# Patient Record
Sex: FEMALE | Race: WHITE | NOT HISPANIC OR LATINO | URBAN - METROPOLITAN AREA
[De-identification: names, ages, dates, MRNs, and addresses within clinical notes are randomized per-mention and may not be internally consistent; named-entity substitution may affect disease eponyms.]

---

## 2017-10-20 ENCOUNTER — TELEPHONE (OUTPATIENT)
Dept: INTERNAL MEDICINE | Age: 32
End: 2017-10-20

## 2018-04-11 NOTE — ED
Antoinette CONN Abhishek, scribed for Kj Leach MD on 04/11/18 at 0616 .





Throat Pain/Nasal Congestion





- HPI Summary


HPI Summary: 





The pt is a 31 y/o female with a chief complaint of dental pain and is 

presenting to the [facility]. The pt states she has an "abscess in the tooth" and 

that the pain is "unbearable." Pt denies of fever. Pt also reports from being 

"sick" with vertigo. She also states that she needs new antibiotic. The patient 

rates the pain 6/10 in severity. Symptoms aggravated by nothing. Symptoms 

alleviated by nothing.





- History of Current Complaint


Chief Complaint: EDDentalPain


Time Seen by Provider: 04/11/18 04:32


Hx Obtained From: Patient


Onset/Duration: Gradual Onset, Still Present


Associated Signs And Symptoms: Positive: Negative





- Allergies/Home Medications


Allergies/Adverse Reactions: 


 Allergies











Allergy/AdvReac Type Severity Reaction Status Date / Time


 


codeine Allergy  Vomiting Verified 04/11/18 04:27


 


Penicillins Allergy  Rash And Verified 04/11/18 04:27





   Itching  











Home Medications: 


 Home Medications





NK [No Home Medications Reported]  04/11/18 [History Confirmed 04/11/18]











PMH/Surg Hx/FS Hx/Imm Hx


Previously Healthy: Yes


Sensory History: 


   Denies: Hx Legally Blind, Hx Deafness


Opthamlomology History: 


   Denies: Hx Legally Blind


Infectious Disease History: No


Infectious Disease History: 


   Denies: Traveled Outside the [place] in Last 30 Days





- Family History


Known Family History: Positive: Cardiac Disease, Diabetes





- Social History


Occupation: Employed Full-time


Alcohol Use: None


Substance Use Type: Reports: None


Smoking Status (MU): Never Smoked Tobacco





Review of Systems


Negative: Fever


Eyes: Negative


ENT: Negative


Cardiovascular: Negative


Respiratory: Negative


Gastrointestinal: Negative


Genitourinary: Negative


Musculoskeletal: Other - Dental Pain


Skin: Negative


Neurological: Negative


Psychological: Other - sick from "vertigo"


All Other Systems Reviewed And Are Negative: Yes





Physical Exam





- Summary


Physical Exam Summary: 





VITAL SIGNS: Reviewed.


GENERAL: ~Patient is a well-developed and nourished (MALE OR FEMALE) who is 

lying comfortable in the stretcher. Patient is not in any acute respiratory 

distress.


HEAD AND FACE: No signs of trauma. No ecchymosis, hematomas or skull 

depressions. No sinus tenderness.


EYES: PERRLA, EOMI x 2, No injected conjunctiva, no nystagmus.


EARS: Hearing grossly intact. Ear canals and tympanic membranes are within 

normal limits.


MOUTH: Dental lower left last molar decay; No signs of gingivitis 


NECK: Supple, trachea is midline, no adenopathy, no JVD, no carotid bruit, no c-

spine tenderness, neck with full ROM.


CHEST: Symmetric, no tenderness at palpation


LUNGS: Clear to auscultation bilaterally. No wheezing or crackles.


CVS: Regular rate and rhythm, S1 and S2 present, no murmurs or gallops 

appreciated.


ABDOMEN: Soft, non-tender. No signs of distention. No rebound no guarding, and 

no masses palpated. Bowel sounds are normal.


EXTREMITIES: FROM in all major joints, no edema, no cyanosis or clubbing.


NEURO: Alert and oriented x 3. No acute neurological deficits. Speech is normal 

and follows commands.


SKIN: Dry and warm


Triage Information Reviewed: Yes


Vital Signs On Initial Exam: 


 Initial Vitals











Temp Pulse Resp BP Pulse Ox


 


 97.8 F   99   16   151/96   99 


 


 04/11/18 04:23  04/11/18 04:23  04/11/18 04:23  04/11/18 04:23  04/11/18 04:23











Vital Signs Reviewed: Yes





Diagnostics





- Vital Signs


 Vital Signs











  Temp Pulse Resp BP Pulse Ox


 


 04/11/18 05:02  98.6 F  101  16  146/95  98


 


 04/11/18 04:23  97.8 F  99  16  151/96  99














- Laboratory


Lab Statement: Any lab studies that have been ordered have been reviewed, and 

results considered in the medical decision making process.





EENT Course/Dx





- Course


Course Of Treatment: The pt is a 31 y/o pt with a chief complaint of dental 

pain. The pt states she has an "abscess" in her teeth. The physicial exam 

showed dental decay and no sign of gingivitus. The pt will be discharged with a 

recommendation to follow up with her primary care physician and dentist. The dx 

will be tooth decay.





- Diagnoses


Provider Diagnoses: 


 Tooth decay








Discharge





- Sign-Out/Discharge


Documenting (check all that apply): Discharge - Home





- Discharge Plan


Condition: Good


Disposition: HOME


Patient Education Materials:  Dental Abscess (ED), Toothache (ED)


Referrals: 


Jose L Zamudio DDS [Medical Doctor] -  (Follow up with Dentist today.)


No Primary Care Phys,NOPCP [Primary Care Provider] - 


Additional Instructions: 


RETURN TO EMERGENCY DEPARTMENT FOR ANY NEW OR WORSENING SYMPTOMS





The documentation as recorded by the Antoinette hoang Abhishek accurately reflects 

the service I personally performed and the decisions made by me, Kj Leach MD.

## 2018-06-17 ENCOUNTER — HOSPITAL ENCOUNTER (EMERGENCY)
Dept: HOSPITAL 25 - ED | Age: 33
LOS: 1 days | Discharge: HOME | End: 2018-06-18
Payer: SELF-PAY

## 2018-06-17 DIAGNOSIS — R42: Primary | ICD-10-CM

## 2018-06-17 DIAGNOSIS — K21.9: ICD-10-CM

## 2018-06-17 DIAGNOSIS — Z88.0: ICD-10-CM

## 2018-06-17 DIAGNOSIS — Z88.5: ICD-10-CM

## 2018-06-17 PROCEDURE — 93005 ELECTROCARDIOGRAM TRACING: CPT

## 2018-06-17 PROCEDURE — 85025 COMPLETE CBC W/AUTO DIFF WBC: CPT

## 2018-06-17 PROCEDURE — 80053 COMPREHEN METABOLIC PANEL: CPT

## 2018-06-17 PROCEDURE — 36415 COLL VENOUS BLD VENIPUNCTURE: CPT

## 2018-06-17 PROCEDURE — 99282 EMERGENCY DEPT VISIT SF MDM: CPT

## 2018-06-17 PROCEDURE — 83735 ASSAY OF MAGNESIUM: CPT

## 2018-06-17 NOTE — ED
Dizziness





- HPI Summary


HPI Summary: 


32-year-old female presents with vertigo for the past couple hours.  She states 

she has history of vertigo.  She denies any injury.  She denies any headache.  

She does have a history of migraines has not had one recently.  She denies any 

recent illness.  She admits to some sinus congestion.  She has not tried 

anything for the sinus congestion.  She states it feels like her ears have a 

pop but they're not popping.   she states everything sounds mutt.  She states 

that the vertigo is worse with positional changes.  She denies any chest pain 

or shortness of breath.  No palpitations.  She has history of vertigo and GERD.

  nothing is different about this episode expect she took dramamine which she 

normally takes without relief.  She admits to nausea and vomiting.  She states 

that antivert normal works for such but she does not have any. 





- History Of Current Complaint


Chief Complaint: EDDizziness


Stated Complaint: POSSIBLE VERTIGO


Time Seen by Provider: 06/17/18 23:28





- Allergies/Home Medications


Allergies/Adverse Reactions: 


 Allergies











Allergy/AdvReac Type Severity Reaction Status Date / Time


 


codeine Allergy  Vomiting Verified 06/17/18 23:24


 


Penicillins Allergy  Rash And Verified 06/17/18 23:24





   Itching  


 


promethazine [From Phenergan] Allergy  muscle Verified 06/17/18 23:24





   twitching  














PMH/Surg Hx/FS Hx/Imm Hx


Endocrine/Hematology History: 


   Denies: Hx Anticoagulant Therapy


Sensory History: 


   Denies: Hx Legally Blind, Hx Deafness


Opthamlomology History: 


   Denies: Hx Legally Blind


Neurological History: Reports: Hx Migraine


Infectious Disease History: No


Infectious Disease History: 


   Denies: Traveled Outside the [place] in Last 30 Days





- Family History


Known Family History: Positive: Cardiac Disease, Diabetes, Other - migraines





- Social History


Alcohol Use: None


Substance Use Type: Reports: None


Smoking Status (MU): Never Smoked Tobacco





Review of Systems


Negative: Fever


Negative: Chest Pain


Negative: Shortness Of Breath


Neurological: Other - vertigo


All Other Systems Reviewed And Are Negative: Yes





Physical Exam


Triage Information Reviewed: Yes


Vital Signs On Initial Exam: 


 Initial Vitals











Temp Pulse Resp BP Pulse Ox


 


 97.7 F   78   16   139/97   100 


 


 06/17/18 23:20  06/17/18 23:20  06/17/18 23:20  06/17/18 23:20  06/17/18 23:20











Vital Signs Reviewed: Yes


Appearance: Positive: Well-Appearing


Skin: Positive: Warm, Dry


Head/Face: Positive: Normal Head/Face Inspection


Eyes: Positive: Normal, EOMI, MORENO, Conjunctiva Clear


ENT: Positive: Normal ENT inspection, Pharynx normal, TMs normal


Respiratory/Lung Sounds: Positive: Clear to Auscultation, Breath Sounds Present


Cardiovascular: Positive: Normal, RRR


Abdomen Description: Positive: Nontender, Soft


Bowel Sounds: Positive: Present


Musculoskeletal: Positive: Normal


Neurological: Positive: Sensory/Motor Intact, Alert, Oriented to Person Place, 

Time, CN Intact II-III - nystagmus


Psychiatric: Positive: Normal





Diagnostics





- Vital Signs


 Vital Signs











  Temp Pulse Resp BP Pulse Ox


 


 06/17/18 23:20  97.7 F  78  16  139/97  100














- Laboratory


Result Diagrams: 


 06/17/18 23:45





 06/17/18 23:45


Lab Statement: Any lab studies that have been ordered have been reviewed, and 

results considered in the medical decision making process.





- EKG


  ** No standard instances


EKG Rhythm: Sinus Rhythm


EKG Interpretation: sinus rhythm





Re-Evaluation





- Re-Evaluation


  ** First Eval


Re-Evaluation Time: 00:57


Change: Improved - vertigo less





  ** Second Eval


Re-Evaluation Time: 01:18


Change: Improved


Comment: symptoms resolve with another dose of antivert





Dizzy Course/Dx





- Course


Course Of Treatment: 32-year-old female presents with vertigo for the past 

couple hours.  She states she has history of vertigo.  She denies any injury.  

She denies any headache.  She does have a history of migraines has not had one 

recently.  She denies any recent illness.  She admits to some sinus congestion.

  She has not tried anything for the sinus congestion.  She states it feels 

like her ears have a pop but they're not popping.   she states everything 

sounds mutt.  She states that the vertigo is worse with positional changes.  

She denies any chest pain or shortness of breath.  No palpitations.  She has 

history of vertigo and GERD.  nothing is different about this episode expect 

she took dramamine which she normally takes without relief.  She admits to 

nausea and vomiting.  She states that antivert normal works for such but she 

does not have any.  On exam has nystagmus noted.  Normal neuro exam. gave 

antivert and vertigo improved. will write a script for vertigo. patient 

understand and agrees with plan.





- Diagnoses


Differential Diagnosis/HQI/PQRI: Benign Paroxysmal Positional Vertigo, 

Hypovolemia, Metabolic Abnormality


Provider Diagnoses: 


 Vertigo








Discharge





- Sign-Out/Discharge


Documenting (check all that apply): Discharge/Admit/Transfer





- Discharge Plan


Condition: Good


Disposition: HOME


Prescriptions: 


Meclizine TAB* [Antivert 12.5 TAB*] 25 mg PO QID #20 tab


Patient Education Materials:  Vertigo (ED)


Referrals: 


[facility] [place] [facility],[place] [Medical Doctor] - 


[facility] [place] [facility] REFERRAL [Outside]


Additional Instructions: 


Establish care with primary to follow up


Take antivert up to four tablets a day


Drink plenty of fluids


Return to ED if develop any new or worsening symptoms





- Billing Disposition and Condition


Condition: GOOD


Disposition: Home

## 2018-06-18 VITALS — DIASTOLIC BLOOD PRESSURE: 78 MMHG | SYSTOLIC BLOOD PRESSURE: 118 MMHG

## 2018-06-18 LAB
BASOPHILS # BLD AUTO: 0.1 10^3/UL (ref 0–0.2)
EOSINOPHIL # BLD AUTO: 0.1 10^3/UL (ref 0–0.6)
HCT VFR BLD AUTO: 34 % (ref 35–47)
HGB BLD-MCNC: 10.9 G/DL (ref 12–16)
LYMPHOCYTES # BLD AUTO: 3.7 10^3/UL (ref 1–4.8)
MCH RBC QN AUTO: 23 PG (ref 27–31)
MCHC RBC AUTO-ENTMCNC: 32 G/DL (ref 31–36)
MCV RBC AUTO: 70 FL (ref 80–97)
MONOCYTES # BLD AUTO: 0.8 10^3/UL (ref 0–0.8)
NEUTROPHILS # BLD AUTO: 3.5 10^3/UL (ref 1.5–7.7)
NRBC # BLD AUTO: 0 10^3/UL
NRBC BLD QL AUTO: 0.1
PLATELET # BLD AUTO: 337 10^3/UL (ref 150–450)
RBC # BLD AUTO: 4.85 10^6/UL (ref 4–5.4)
WBC # BLD AUTO: 8.2 10^3/UL (ref 3.5–10.8)